# Patient Record
Sex: MALE | URBAN - METROPOLITAN AREA
[De-identification: names, ages, dates, MRNs, and addresses within clinical notes are randomized per-mention and may not be internally consistent; named-entity substitution may affect disease eponyms.]

---

## 2021-02-24 ENCOUNTER — HISTORICAL (OUTPATIENT)
Dept: ADMINISTRATIVE | Facility: HOSPITAL | Age: 41
End: 2021-02-24

## 2021-02-24 LAB
ABS NEUT (OLG): 3.5 X10(3)/MCL (ref 2.1–9.2)
ERYTHROCYTE [DISTWIDTH] IN BLOOD BY AUTOMATED COUNT: 11.2 % (ref 11.5–17)
HCT VFR BLD AUTO: 43.5 % (ref 42–52)
HGB BLD-MCNC: 14.9 GM/DL (ref 14–18)
LYMPHOCYTES # BLD AUTO: 1.6 X10(3)/MCL (ref 0.6–3.4)
LYMPHOCYTES NFR BLD AUTO: 29.3 % (ref 13–40)
MCH RBC QN AUTO: 30.7 PG (ref 27–31.2)
MCHC RBC AUTO-ENTMCNC: 34 GM/DL (ref 32–36)
MCV RBC AUTO: 90 FL (ref 80–94)
MONOCYTES # BLD AUTO: 0.5 X10(3)/MCL (ref 0.1–1.3)
MONOCYTES NFR BLD AUTO: 9.5 % (ref 0.1–24)
NEUTROPHILS NFR BLD AUTO: 61.2 % (ref 47–80)
PLATELET # BLD AUTO: 180 X10(3)/MCL (ref 130–400)
PMV BLD AUTO: 9.7 FL (ref 9.4–12.4)
RBC # BLD AUTO: 4.85 X10(6)/MCL (ref 4.7–6.1)
WBC # SPEC AUTO: 5.6 X10(3)/MCL (ref 4.5–11.5)

## 2022-04-10 ENCOUNTER — HISTORICAL (OUTPATIENT)
Dept: ADMINISTRATIVE | Facility: HOSPITAL | Age: 42
End: 2022-04-10

## 2022-04-26 VITALS
SYSTOLIC BLOOD PRESSURE: 135 MMHG | BODY MASS INDEX: 30.33 KG/M2 | HEIGHT: 70 IN | WEIGHT: 211.88 LBS | DIASTOLIC BLOOD PRESSURE: 80 MMHG

## 2024-11-11 NOTE — HISTORICAL [facility] CERNER
This is a historical note converted from [employer]. Formatting and pictures may have been removed.  Please reference [employer] for original formatting and attached multimedia. Chief Complaint  SWOLLEN LYMPH NODES  History of Present Illness  Patient has noticed several lymph nodes in neck for 3 weeks.  Had COVID at end on December.? Lost taste and? smell for 2 weeks.  Has 3 bumps under tongue for a year, he thinks they are more pronounced in last few months. Has appointment with Dr. Lionel Reveles next Tuesday.  No fever.  Denies Weight loss.? No one ill at home prior to him, a few of children have colds in the last week.  Used cigarettes from late adolescence until a year ago, now vapes.  Review of Systems  See HPI. ?Myalgias in thighs.? Denies weakness?or numbness?in extremities.  Denies palpitations or chest pain. ?Denies?wheezing or shortness of breath.? Denies?abdominal pain, constipation, melena, hematochezia,?or diarrhea.? Denies dysuria or?urinary frequency.  Physical Exam  Vitals & Measurements  BP:?135/80?  HT:?177.50?cm? HT:?177.5?cm? WT:?96.100?kg? WT:?96.1?kg? BMI:?30.5?  General: Patient is alert and oriented and in no apparent distress on room air  Neck : No appreciable lymph nodes in neck anteriorly, posteriorly or supraclavicular.? No carotid bruits, no masses,?mild muscle spasms.  OP: no leukoplakia appreciated. Prominent salivary duct openings under tongue.  CV: RRR, No murmur  LUNGS : CTAB.  ABD:?Soft, NABS, nontender, no masses  Back: No CVAT  Ext:?2+ DP and radial pulses bilaterally, no CCE, no evidence of DVT, no LAD  Assessment/Plan  1.?Neck pain?M54.2  ?Do not appreciate lymphadenopathy.? CBC is normal.? Advised on posture and neck stretches.? If pain persists then we could?consider physical therapy.? If he thinks he continues to?appreciate lymph nodes then I would refer to an ENT.  2.?Nicotine dependence?F17.200  ?Instructed on adverse health consequences of nicotine use.? Educated on?ways to quit,  including?pharmaceutical regimens.? He is trying to wean off and declines offer for medication at this point.? Advised to call if he desires medication.  3.?Oral mass?K13.79  ?Has appointment next week with dentist for possible excision.  Referrals  Clinic Follow-up PRN, 02/24/21 15:31:00 CST, [facility], Future Order   Problem List/Past Medical History  Ongoing  Obesity  Skin tag  Tobacco user  Historical  No qualifying data  Medications  No active medications  Allergies  No Known Medication Allergies  Social History  Abuse/Neglect  No, 02/24/2021  Tobacco  4 or less cigarettes(less than 1/4 pack)/day in last 30 days, No, 02/24/2021  Current every day smoker Use:., 05/15/2018  Family History  Family history is unknown  Health Maintenance  Health Maintenance  ???Pending?(in the next year)  ??? ??OverDue  ??? ? ? ?Influenza Vaccine due??10/01/20??and every 1??day(s)  ??? ? ? ?Smoking Cessation due??01/01/21??Variable frequency  ??? ??Due?  ??? ? ? ?Alcohol Misuse Screening due??01/02/21??and every 1??year(s)  ??? ? ? ?ADL Screening due??03/02/21??and every 1??year(s)  ??? ? ? ?Depression Screening due??03/02/21??Unknown Frequency  ??? ? ? ?Lipid Screening due??03/02/21??Unknown Frequency  ??? ? ? ?Tetanus Vaccine due??03/02/21??and every 10??year(s)  ??? ??Due In Future?  ??? ? ? ?Obesity Screening not due until??01/01/22??and every 1??year(s)  ??? ? ? ?Blood Pressure Screening not due until??02/24/22??and every 1??year(s)  ??? ? ? ?Body Mass Index Check not due until??02/24/22??and every 1??year(s)  ???Satisfied?(in the past 1 year)  ??? ??Satisfied?  ??? ? ? ?Blood Pressure Screening on??02/24/21.??Satisfied by Woodrow Villafana  ??? ? ? ?Body Mass Index Check on??02/24/21.??Satisfied by Woodrow Villafana  ??? ? ? ?Influenza Vaccine on??02/24/21.??Satisfied by Woodrow Villafana  ??? ? ? ?Obesity Screening on??02/24/21.??Satisfied by Woodrow Villafana  ?  Lab Results  Test Name Test Result Date/Time   WBC 5.6 x10(3)/mcL 02/24/2021  15:22 CST   Hgb 14.9 gm/dL 02/24/2021 15:22 CST   Hct 43.5 % 02/24/2021 15:22 CST   Platelet 180 x10(3)/mcL 02/24/2021 15:22 CST       no